# Patient Record
Sex: MALE | Race: OTHER | Employment: UNEMPLOYED | URBAN - METROPOLITAN AREA
[De-identification: names, ages, dates, MRNs, and addresses within clinical notes are randomized per-mention and may not be internally consistent; named-entity substitution may affect disease eponyms.]

---

## 2024-08-08 ENCOUNTER — HOSPITAL ENCOUNTER (EMERGENCY)
Facility: HOSPITAL | Age: 49
Discharge: HOME | End: 2024-08-08
Attending: EMERGENCY MEDICINE

## 2024-08-08 VITALS
DIASTOLIC BLOOD PRESSURE: 84 MMHG | OXYGEN SATURATION: 98 % | RESPIRATION RATE: 17 BRPM | SYSTOLIC BLOOD PRESSURE: 153 MMHG | TEMPERATURE: 98.5 F | HEART RATE: 68 BPM

## 2024-08-08 DIAGNOSIS — K64.9 HEMORRHOIDS: ICD-10-CM

## 2024-08-08 DIAGNOSIS — K08.89 PAIN, DENTAL: Primary | ICD-10-CM

## 2024-08-08 PROCEDURE — 99282 EMERGENCY DEPT VISIT SF MDM: CPT

## 2024-08-08 PROCEDURE — 99284 EMERGENCY DEPT VISIT MOD MDM: CPT | Performed by: PHYSICIAN ASSISTANT

## 2024-08-08 RX ORDER — HYDROCORTISONE ACETATE 25 MG/1
25 SUPPOSITORY RECTAL 2 TIMES DAILY
Qty: 12 SUPPOSITORY | Refills: 0 | Status: SHIPPED | OUTPATIENT
Start: 2024-08-08

## 2024-08-08 RX ORDER — DOCUSATE SODIUM 100 MG/1
100 CAPSULE, LIQUID FILLED ORAL EVERY 12 HOURS
Qty: 60 CAPSULE | Refills: 0 | Status: SHIPPED | OUTPATIENT
Start: 2024-08-08

## 2024-08-08 RX ORDER — AMOXICILLIN 500 MG/1
500 TABLET, FILM COATED ORAL 2 TIMES DAILY
Qty: 20 TABLET | Refills: 0 | Status: SHIPPED | OUTPATIENT
Start: 2024-08-08 | End: 2024-08-18

## 2024-08-08 RX ORDER — NAPROXEN 500 MG/1
500 TABLET ORAL 2 TIMES DAILY WITH MEALS
Qty: 30 TABLET | Refills: 0 | Status: SHIPPED | OUTPATIENT
Start: 2024-08-08

## 2024-08-08 NOTE — DISCHARGE INSTRUCTIONS
INSTRUCCIONES DE DESCARGA:    CARLINE UN SEGUIMIENTO CON GARZA PROVEEDOR DE ATENCIÓN PRIMARIA O CON LA CLÍNICA DE JEMIMA RECOMENDADA. CARLINE SHON RAYMOND PARA SER ATENDIDO.     TOME LOS MEDICAMENTOS SEGÚN LO RECETADO. SI SARPULLIDO, FALTA DE ALIENTO O DIFICULTAD PARA TRAGAR, DEJE DE NINO EL MEDICAMENTO Y HÁGASE UN VISTAZO.     CARLINE UN SEGUIMIENTO CON EL ESPECIALISTA RECOMENDADO.    SI LOS SÍNTOMAS EMPEORAN O SURGEN NUEVOS SÍNTOMAS, REGRESE A LA CRUZ DE EMERGENCIAS PARA QUE LO VEAN.         DISCHARGE INSTRUCTIONS:    FOLLOW UP WITH YOUR PRIMARY CARE PROVIDER OR THE RECOMMENDED HEALTH CLINIC. MAKE AN APPOINTMENT TO BE SEEN.     TAKE MEDICATION AS PRESCRIBED. IF RASH, SHORTNESS OF BREATH OR TROUBLE SWALLOWING, STOP TAKING THE MEDICATION AND BE SEEN.     FOLLOW UP WITH THE RECOMMENDED SPECIALIST.    IF SYMPTOMS WORSEN OR NEW SYMPTOMS ARISE, RETURN TO THE ER TO BE SEEN.

## 2024-08-08 NOTE — ED PROVIDER NOTES
History  Chief Complaint   Patient presents with    Dental Pain     Pt reports left sided dental pain and also reports having hemorrhoids for the last month     Hemorrhoids     49y.o male with no significant PMH presents to the ER for left upper dental pain for 2 days. Patient got medication from the pharmacy that he has been taking without relief. He is unsure the name of the medication. He describes his pain as intense. He denies radiation of pain. Pain is constant. He has seen a dentist in the past but not for these symptoms. He also reports hemorrhoids for the last month. He has been applying an ointment and taking an unknown medication for symptoms. He reports having some pain but mostly itching. He denies fever, chills, URI symptoms, chest pain, dyspnea, N/V/D, abdominal pain, weakness or paresthesias.      History provided by:  Patient   used: Yes ([facility])        None       History reviewed. No pertinent past medical history.    History reviewed. No pertinent surgical history.    History reviewed. No pertinent family history.  I have reviewed and agree with the history as documented.    E-Cigarette/Vaping     E-Cigarette/Vaping Substances          Review of Systems   Constitutional:  Negative for activity change, appetite change, chills and fever.   HENT:  Positive for dental problem. Negative for congestion, drooling, ear discharge, ear pain, facial swelling, rhinorrhea and sore throat.    Eyes:  Negative for redness.   Respiratory:  Negative for cough and shortness of breath.    Cardiovascular:  Negative for chest pain.   Gastrointestinal:  Positive for rectal pain. Negative for abdominal pain, diarrhea, nausea and vomiting.   Musculoskeletal:  Negative for neck stiffness.   Skin:  Negative for rash.   Allergic/Immunologic: Negative for food allergies.   Neurological:  Negative for weakness and numbness.       Physical Exam  Physical Exam  Vitals and nursing note reviewed. Exam  conducted with a chaperone present ([facility] tech present).   Constitutional:       General: He is active. He is not in acute distress.     Appearance: He is not toxic-appearing.   HENT:      Head: Normocephalic and atraumatic.      Mouth/Throat:      Lips: Pink. No lesions.      Mouth: Mucous membranes are moist.      Dentition: Abnormal dentition. Dental tenderness and gingival swelling present. No dental abscesses.      Pharynx: Oropharynx is clear. Uvula midline. No pharyngeal swelling, oropharyngeal exudate, posterior oropharyngeal erythema or uvula swelling.      Tonsils: No tonsillar exudate or tonsillar abscesses.     Eyes:      Conjunctiva/sclera: Conjunctivae normal.   Neck:      Trachea: No tracheal deviation.   Cardiovascular:      Rate and Rhythm: Normal rate.   Pulmonary:      Effort: Pulmonary effort is normal. No respiratory distress.   Abdominal:      General: There is no distension.   Genitourinary:     Rectum: No tenderness or external hemorrhoid.   Musculoskeletal:      Cervical back: Normal range of motion and neck supple.   Skin:     General: Skin is warm and dry.      Findings: No rash.   Neurological:      Mental Status: He is alert.      GCS: GCS eye subscore is 4. GCS verbal subscore is 5. GCS motor subscore is 6.   Psychiatric:         Mood and Affect: Mood and affect and mood normal.         Vital Signs  ED Triage Vitals   Temperature Pulse Respirations Blood Pressure SpO2   08/08/24 1041 08/08/24 1042 08/08/24 1042 08/08/24 1042 08/08/24 1042   98.5 °F (36.9 °C) 68 17 153/84 98 %      Temp Source Heart Rate Source Patient Position - Orthostatic VS BP Location FiO2 (%)   08/08/24 1041 08/08/24 1042 08/08/24 1042 08/08/24 1042 --   Oral Monitor Sitting Right arm       Pain Score       --                  Vitals:    08/08/24 1042   BP: 153/84   Pulse: 68   Patient Position - Orthostatic VS: Sitting         Visual Acuity      ED Medications  Medications - No data to display    Diagnostic  Studies  Results Reviewed       None                   No orders to display              Procedures  Procedures         ED Course                                 SBIRT 20yo+      Flowsheet Row Most Recent Value   Initial Alcohol Screen: US AUDIT-C     1. How often do you have a drink containing alcohol? 0 Filed at: 08/08/2024 1105   2. How many drinks containing alcohol do you have on a typical day you are drinking?  0 Filed at: 08/08/2024 1105   3a. Male UNDER 65: How often do you have five or more drinks on one occasion? 0 Filed at: 08/08/2024 1105   Audit-C Score 0 Filed at: 08/08/2024 1105   LUPE: How many times in the past year have you...    Used an illegal drug or used a prescription medication for non-medical reasons? Never Filed at: 08/08/2024 1105                      Medical Decision Making  49y.o male presents to the ER for left upper dental pain for 2 days and hemorrhoids for 1 month. Vitals are stable. Patient is in no acute distress. On exam, moist mucous membranes seen. Remnants of tooth on left upper gum seen. Swelling of the gum seen with erythema. No abscess or drainage seen. Area is tender to palpation. No trismus. No signs of throat infection. No trouble swallowing or handling secretions. No neck swelling. Breathing is non-labored. No tachypnea or accessory muscle use. Heart is regular rate. Abdomen is not distended. [facility] tech present for  exam. No signs of hemorrhoids seen. No erythema, swelling or abscess seen. Area is non-tender. Will treat symptoms and give outpatient follow up.    The management plan was discussed in detail with the patient at bedside and all questions were answered.  Prior to discharge, we provided both verbal and written instructions.  We discussed with the patient the signs and symptoms for which to return to the emergency department.  All questions were answered and patient was comfortable with the plan of care and discharged to home.  Instructed the patient to  follow up with the primary care provider and/or specialist provided and their written instructions.  The patient verbalized understanding of our discussion and plan of care, and agrees to return to the Emergency Department for concerns and progression of illness.    At discharge, I instructed the patient to:  -follow up with pcp  -take Naproxen, Amoxicillin and Colace as prescribed  -apply Anusol and Preparation H as prescribed  -follow up with Colorectal and dentist  -return to the ER if symptoms worsened or new symptoms arose  Patient agreed to this plan and was stable at time of discharge.     Problems Addressed:  Hemorrhoids: acute illness or injury  Pain, dental: acute illness or injury    Amount and/or Complexity of Data Reviewed  Independent Historian:      Details: Patient is historian    Risk  OTC drugs.  Prescription drug management.                 Disposition  Final diagnoses:   Pain, dental   Hemorrhoids     Time reflects when diagnosis was documented in both MDM as applicable and the Disposition within this note       Time User Action Codes Description Comment    8/8/2024 11:02 AM Shakila Fuentes Add [K08.89] Pain, dental     8/8/2024 11:02 AM Shakila Fuentes Add [K64.9] Hemorrhoids           ED Disposition       ED Disposition   Discharge    Condition   Stable    Date/Time   Thu Aug 8, 2024 1102    Comment   Sam Hall discharge to home/self care.                   Follow-up Information       Follow up With Specialties Details Why Contact Info Additional Information    [facility] Schedule an appointment as soon as possible for a visit  As needed [address] [place] [address] [phone] [facility], [address] [place] [address], [address] [phone]    [facility]  Schedule an appointment as soon as possible for  a visit   [address] [place] [address] [address]  [facility] [address]  [phone]     [facility] Colorectal Surgery Pod Colon and Rectal Surgery Schedule an appointment as soon as possible for a visit   [address] [facility] [address] [phone]             Discharge Medication List as of 8/8/2024 11:08 AM        START taking these medications    Details   amoxicillin (AMOXIL) 500 MG tablet Take 1 tablet (500 mg total) by mouth 2 (two) times a day for 10 days, Starting Thu 8/8/2024, Until Sun 8/18/2024, Normal      docusate sodium (COLACE) 100 mg capsule Take 1 capsule (100 mg total) by mouth every 12 (twelve) hours, Starting Thu 8/8/2024, Normal      hydrocortisone (ANUSOL-HC) 25 mg suppository Insert 1 suppository (25 mg total) into the rectum 2 (two) times a day, Starting Thu 8/8/2024, Normal      naproxen (NAPROSYN) 500 mg tablet Take 1 tablet (500 mg total) by mouth 2 (two) times a day with meals, Starting Thu 8/8/2024, Normal      phenylephrine-shark liver oil-mineral oil-petrolatum (PREPARATION H) 0.25-3-14-71.9 % rectal ointment Insert into the rectum 2 (two) times a day as needed for hemorrhoids, Starting Thu 8/8/2024, Normal             No discharge procedures on file.    PDMP Review       None            ED Provider  Electronically Signed by             Shakila Fuentes PA-C  08/08/24 3486